# Patient Record
Sex: MALE | Race: BLACK OR AFRICAN AMERICAN | NOT HISPANIC OR LATINO | Employment: STUDENT | URBAN - METROPOLITAN AREA
[De-identification: names, ages, dates, MRNs, and addresses within clinical notes are randomized per-mention and may not be internally consistent; named-entity substitution may affect disease eponyms.]

---
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
## 2023-06-22 ENCOUNTER — HOSPITAL ENCOUNTER (EMERGENCY)
Facility: HOSPITAL | Age: 23
Discharge: HOME | End: 2023-06-22
Attending: EMERGENCY MEDICINE
Payer: MEDICAID

## 2023-06-22 VITALS
BODY MASS INDEX: 31.08 KG/M2 | SYSTOLIC BLOOD PRESSURE: 173 MMHG | WEIGHT: 250 LBS | RESPIRATION RATE: 20 BRPM | OXYGEN SATURATION: 99 % | DIASTOLIC BLOOD PRESSURE: 92 MMHG | TEMPERATURE: 99 F | HEIGHT: 75 IN | HEART RATE: 80 BPM

## 2023-06-22 DIAGNOSIS — S93.401A SPRAIN OF RIGHT ANKLE, UNSPECIFIED LIGAMENT, INITIAL ENCOUNTER: Primary | ICD-10-CM

## 2023-06-22 DIAGNOSIS — M25.571 ANKLE PAIN, RIGHT: ICD-10-CM

## 2023-06-22 PROCEDURE — 99283 EMERGENCY DEPT VISIT LOW MDM: CPT | Mod: ER

## 2023-06-22 RX ORDER — IBUPROFEN 600 MG/1
600 TABLET ORAL EVERY 6 HOURS PRN
Qty: 20 TABLET | Refills: 0 | Status: SHIPPED | OUTPATIENT
Start: 2023-06-22

## 2023-06-22 NOTE — ED PROVIDER NOTES
Encounter Date: 6/22/2023       History     Chief Complaint   Patient presents with    Ankle Injury     Patient reports he injured his right ankle yesterday while playing basketball. Pain and swelling noted to the right ankle.     Rowdy Arias is a 23 y.o. male who  has no past medical history on file.    The patient presents to the ED due to right ankle pain.  Patient was playing basketball when he jumped and fell onto his ankle which was rolled inward.  He reports no additional injuries.  He states he is able to walk with a limp.  He tried taking ibuprofen last night.  Today he woke up is ankle swollen his having more trouble walking prompting him to come in for further evaluation.  No other concerns today.  No known medical problems.  No allergies.    Review of patient's allergies indicates:  No Known Allergies  No past medical history on file.  No past surgical history on file.  Family History   Problem Relation Age of Onset    No Known Problems Mother     No Known Problems Father      Social History     Tobacco Use    Smoking status: Never    Smokeless tobacco: Never   Substance Use Topics    Alcohol use: No    Drug use: No     Review of Systems   Constitutional:  Negative for fever.   Respiratory:  Negative for shortness of breath.    Cardiovascular:  Negative for chest pain.   Gastrointestinal:  Negative for abdominal pain.   Musculoskeletal:  Positive for arthralgias and joint swelling.   Neurological:  Negative for weakness and numbness.     Physical Exam     Initial Vitals [06/22/23 1114]   BP Pulse Resp Temp SpO2   (!) 173/92 80 20 98.5 °F (36.9 °C) 99 %      MAP       --         Physical Exam    Nursing note and vitals reviewed.  Constitutional: He appears well-developed and well-nourished. He is not diaphoretic. No distress.   HENT:   Head: Normocephalic and atraumatic.   Eyes: Conjunctivae are normal.   Cardiovascular:  Regular rhythm and intact distal pulses.           Musculoskeletal:       Comments: Right lower extremity:  Sensation intact to light touch.  DP/PT pulses palpable.  No knee or shin tenderness.  Swelling noted to lateral malleolus.  No midfoot/tenderness to the 5th metatarsal.  Able to plantar flex.     Neurological: He is alert.   Skin: Skin is warm and dry. Capillary refill takes less than 2 seconds. No rash noted.   Psychiatric: He has a normal mood and affect.       ED Course   Procedures  Labs Reviewed - No data to display       Imaging Results              X-Ray Ankle Complete Right (Final result)  Result time 06/22/23 11:45:10      Final result by CALLY Nunn Sr., MD (06/22/23 11:45:10)                   Impression:      There is mild prominence of the soft tissue thickness lateral to the ankle.  This is characteristic of a soft tissue contusion or cellulitis.      Electronically signed by: Erlin Nunn MD  Date:    06/22/2023  Time:    11:45               Narrative:    EXAMINATION:  XR ANKLE COMPLETE 3 VIEW RIGHT    CLINICAL HISTORY:  Pain in right ankle and joints of right foot    COMPARISON:  None    FINDINGS:  There is no fracture. There is no dislocation.  There is mild prominence of the soft tissue thickness lateral to the ankle.                                       Medications - No data to display  Medical Decision Making:   Initial Assessment:   Patient presents with swelling to his lateral ankle.  Suspect ankle sprain.  Will plan to obtain x-ray and reassess.  Patient declined pain medications at this time.  Differential Diagnosis:   Differential Diagnosis includes, but is not limited to:  Fracture, dislocation, compartment syndrome, nerve injury/palsy, vascular injury, rhabdomyolysis, hemarthrosis, septic joint, bursitis, muscle strain, ligament tear/sprain, laceration with foreign body, abrasion, soft tissue contusion, osteoarthritis.    ED Management:  Will treat for ankle sprain based on x-ray results.  Ace wrap and crutches provide.  Discussed NSAIDs rest ice  compression elevation and PCP follow-up in 1 week if not better.  Return precautions discussed for any new concerns.    After taking into careful account the historical factors and physical exam findings of the patient's presentation today, in conjunction with the empirical and objective data obtained on ED workup, no acute emergent medical condition has been identified. The patient appears to be low risk for an emergent medical condition and I feel it is safe and appropriate at this time for the patient to be discharged to follow-up as detailed in their discharge instructions for reevaluation and possible continued outpatient workup and management. I have discussed the specifics of the workup with the patient and the patient has verbalized understanding of the details of the workup, the diagnosis, the treatment plan, and the need for outpatient follow-up.  Although the patient has no emergent etiology today this does not preclude the development of an emergent condition so in addition, I have advised the patient that they can return to the ED and/or activate EMS at any time with worsening of their symptoms, change of their symptoms, or with any other medical complaint.  The patient remained comfortable and stable during their visit in the ED.  Discharge and follow-up instructions discussed with the patient who expressed understanding and willingness to comply with my recommendations.             ED Course as of 06/22/23 1234   Thu Jun 22, 2023   1152 X-Ray Reviewed. Negative for acute fracture or dislocation by my independent interpretation [RN]      ED Course User Index  [RN] Angelo Weston Jr., MD                 Clinical Impression:   Final diagnoses:  [M25.571] Ankle pain, right  [S93.401A] Sprain of right ankle, unspecified ligament, initial encounter (Primary)        ED Disposition Condition    Discharge Stable          ED Prescriptions       Medication Sig Dispense Start Date End Date Auth. Provider     ibuprofen (ADVIL,MOTRIN) 600 MG tablet Take 1 tablet (600 mg total) by mouth every 6 (six) hours as needed. 20 tablet 6/22/2023 -- Angelo Weston Jr., MD          Follow-up Information       Follow up With Specialties Details Why Contact Info    Jeninfer Disla MD Internal Medicine In 1 week  [address]  [address]  [facility] [address]  [phone]            Portions of this note were dictated using voice recognition software and may contain dictation related errors in spelling/grammar/syntax not found on text review         Angelo Weston Jr., MD  06/22/23 9421

## 2023-06-22 NOTE — Clinical Note
"Rowdy Hairstoneddie Arias was seen and treated in our emergency department on 6/22/2023.  He may return to work on 06/26/2023.       If you have any questions or concerns, please don't hesitate to call.      GEE Cruz RN    "

## 2023-06-22 NOTE — Clinical Note
"Rowdy Hairstoneddie Arias was seen and treated in our emergency department on 6/22/2023.  He may return to work on 06/28/2023.       If you have any questions or concerns, please don't hesitate to call.      GEE Cruz RN    "

## 2023-06-22 NOTE — DISCHARGE INSTRUCTIONS
